# Patient Record
(demographics unavailable — no encounter records)

---

## 2024-10-25 NOTE — DISCUSSION/SUMMARY
[EKG obtained to assist in diagnosis and management of assessed problem(s)] : EKG obtained to assist in diagnosis and management of assessed problem(s) [FreeTextEntry1] : Patient is a 61 year-old gentleman with history and risk factors as above who presents today for cardiac evaluation prior to possible renal transplant. He has a history of bicuspid aortic valve, and on Echo in September 2024 at [facility], he was seen to have severe MR, possible flail anterior mitral leaflet. He remains well compensated at this time. Nuclear stress test to evaluate for ischemic heart disease done with Johann Enriquez MD  He knows that having his aortic valve replaced and mitral valve addressed will likely necessitate starting HD.

## 2024-10-25 NOTE — PHYSICAL EXAM
[General Appearance - Well Developed] : well developed [Normal Appearance] : normal appearance [Well Groomed] : well groomed [General Appearance - Well Nourished] : well nourished [No Deformities] : no deformities [General Appearance - In No Acute Distress] : no acute distress [Conjunctiva] : the conjunctiva were normal in both eyes [PERRL] : pupils were equal in size, round, and reactive to light [EOM Intact] : extraocular movements were intact [Normal Oral Mucosa] : normal oral mucosa [No Oral Pallor] : no oral pallor [No Oral Cyanosis] : no oral cyanosis [Normal Oropharynx] : normal oropharynx [Normal Jugular Venous A Waves Present] : normal jugular venous A waves present [Normal Jugular Venous V Waves Present] : normal jugular venous V waves present [No Jugular Venous Myles A Waves] : no jugular venous myles A waves [5th Left ICS - MCL] : palpated at the 5th LICS in the midclavicular line [Normal] : normal [No Precordial Heave] : no precordial heave was noted [Normal Rate] : normal [Rhythm Regular] : regular [Normal S1] : normal S1 [Normal S2] : normal S2 [No Gallop] : no gallop heard [III] : a grade 3 [II] : a grade 2 [No Pitting Edema] : no pitting edema present [] : no respiratory distress [Respiration, Rhythm And Depth] : normal respiratory rhythm and effort [Exaggerated Use Of Accessory Muscles For Inspiration] : no accessory muscle use [Auscultation Breath Sounds / Voice Sounds] : lungs were clear to auscultation bilaterally [Bowel Sounds] : normal bowel sounds [Abdomen Soft] : soft [Abdomen Tenderness] : non-tender [Abnormal Walk] : normal gait [Gait - Sufficient For Exercise Testing] : the gait was sufficient for exercise testing [Nail Clubbing] : no clubbing of the fingernails [Cyanosis, Localized] : no localized cyanosis [Skin Color & Pigmentation] : normal skin color and pigmentation [No Venous Stasis] : no venous stasis [No Xanthoma] : no  xanthoma was observed [Oriented To Time, Place, And Person] : oriented to person, place, and time [Impaired Insight] : insight and judgment were intact [Affect] : the affect was normal [Mood] : the mood was normal [No Anxiety] : not feeling anxious [Yellow Sclera (Icteric)] : no scleral icterus was seen [FreeTextEntry1] : multiple polychromatic tattoos, most recent was two headed eagle/dragon on anterior neck, his last name on posterior neck;

## 2024-10-25 NOTE — HISTORY OF PRESENT ILLNESS
[FreeTextEntry1] : Patient is a 60 year-old gentleman with known family history of coronary artery disease (father  at age 54), bicuspid aortic valve, paroxysmal SVT status post ablation (), with CKD secondary to polycystic kidney disease, who presents today as a preemptive candidate for renal transplant. Patient has no current cardiovascular complaints. Patient plays basketball occasionally and is without chest pains or shortness of breath. He used to play competitively, but these days, he just plays with his kids.  2022 - Patient returns today for follow-up in his usual state of health.  He remains a preemptive candidate for transplant (not yet on HD).  He had Covid-19 infection in 2021. It was a mild case. He had gout last week, and he continues to have mild left big toe pain after a short course of steroids.   2023 - Patient returns today for follow-up in his usual state of health. He remains a preemptive candidate for transplant.  He will have repeat echocardiogram and nuclear stress testing with Dr. Enriquez.   Cardiologist: Johann Enriquez MD [phone] Nephrologist: Burton Barrientos MD [phone]

## 2024-10-25 NOTE — CARDIOLOGY SUMMARY
[de-identified] : 12/4/2023 - atrial rhythm (likely sinus) at 65 bpm, low voltage in limb leads [de-identified] : 2/9/2020, PET stress test showing normal myocardial perfusion imaging, stress LVEF 55% (rest LVEF 46%) - no evidence of inducible ischemia 9/19/2022, 11 minutes of Willem protocol (12 METS), 90% MPHR, no evidence of infarction or ischemia, but LVEF 44% [de-identified] : 7/13/2020, possible bicuspid aortic valve, dilated LA, normal RV size and function (RVSP could not be estimated), normal LV systolic function, LVEF 65% 9/13/2022, bicuspid aortic valve with minimal stenosis, normal strain imaging, average GLS -16.2%, normal LV systolic function, LVEF 55% 9/3/2024 - cannot visualize aortic valve, severely dilated LA, severe mitral regurgitation, possible flail anterior mitral valve leaflet, normal LV systolic function, LVEF 61%

## 2024-10-25 NOTE — DISCUSSION/SUMMARY
[EKG obtained to assist in diagnosis and management of assessed problem(s)] : EKG obtained to assist in diagnosis and management of assessed problem(s) [FreeTextEntry1] : Patient is a 61 year-old gentleman with history and risk factors as above who presents today for cardiac evaluation prior to possible renal transplant. He has a history of bicuspid aortic valve, and on Echo in September 2024 at [facility], he was seen to have severe MR, possible flail anterior mitral leaflet. He remains well compensated at this time. Nuclear stress test to evaluate for ischemic heart disease done with Johann Enriquez MD  He knows that having his aortic valve replaced and mitral valve addressed will likely necessitate starting HD.

## 2024-10-25 NOTE — CARDIOLOGY SUMMARY
[de-identified] : 12/4/2023 - atrial rhythm (likely sinus) at 65 bpm, low voltage in limb leads [de-identified] : 2/9/2020, PET stress test showing normal myocardial perfusion imaging, stress LVEF 55% (rest LVEF 46%) - no evidence of inducible ischemia 9/19/2022, 11 minutes of Willem protocol (12 METS), 90% MPHR, no evidence of infarction or ischemia, but LVEF 44% [de-identified] : 7/13/2020, possible bicuspid aortic valve, dilated LA, normal RV size and function (RVSP could not be estimated), normal LV systolic function, LVEF 65% 9/13/2022, bicuspid aortic valve with minimal stenosis, normal strain imaging, average GLS -16.2%, normal LV systolic function, LVEF 55% 9/3/2024 - cannot visualize aortic valve, severely dilated LA, severe mitral regurgitation, possible flail anterior mitral valve leaflet, normal LV systolic function, LVEF 61%

## 2024-10-25 NOTE — HISTORY OF PRESENT ILLNESS
[FreeTextEntry1] : Patient is a 60 year-old gentleman with known family history of coronary artery disease (father  at age 54), bicuspid aortic valve, paroxysmal SVT status post ablation (), with CKD secondary to polycystic kidney disease, who presents today as a preemptive candidate for renal transplant. Patient has no current cardiovascular complaints. Patient plays basketball occasionally and is without chest pains or shortness of breath. He used to play competitively, but these days, he just plays with his kids.  2022 - Patient returns today for follow-up in his usual state of health.  He remains a preemptive candidate for transplant (not yet on HD).  He had Covid-19 infection in 2021. It was a mild case. He had gout last week, and he continues to have mild left big toe pain after a short course of steroids.   2023 - Patient returns today for follow-up in his usual state of health. He remains a preemptive candidate for transplant.  He will have repeat echocardiogram and nuclear stress testing with Dr. Enriquez.   Cardiologist: Johann Enriquez MD [phone] Nephrologist: Burton Barrientos MD [phone]

## 2024-10-25 NOTE — REASON FOR VISIT
[Other: ____] : [unfilled] [FreeTextEntry1] : October 2024 - Patient returns today for follow-up of his renal transplant list status. He remains a preemptive candidate for transplant. He had an echocardiogram showing normal LVEF at 61% but the aortic valve was not well visualized (history of bicuspid valve) and severe mitral regurgitation with possible anterior leaflet flail. He is very well compensated at this time.

## 2024-11-04 NOTE — HISTORY OF PRESENT ILLNESS
[de-identified] : 60 y/o M with HTN, HLD, bicuspid aortic valve/AR, severe MR, paroxysmal SVT status post ablation (2017), afib?, aortic aneurysm, ESRD due to PCKD, cerebral aneurysm, gout, presenting to establish care. Feeling well, no acute complaints.   Sx Hx: appendectomy, umbilical hernia repair, inguinal hernia repair, cardiac ablation, AV fistula creation   Family Hx: Dad- CAD/MI, PCKD. Brother- MR, AR/bicuspid aortic valve   Social Hx: never smoker, no alcohol, illicit drugs. Lives with fiancee, 2 children.

## 2024-11-04 NOTE — HEALTH RISK ASSESSMENT
[0] : 2) Feeling down, depressed, or hopeless: Not at all (0) [PHQ-2 Negative - No further assessment needed] : PHQ-2 Negative - No further assessment needed [Never] : Never [Good] : ~his/her~  mood as  good [No] : No [No falls in past year] : Patient reported no falls in the past year [[id number]] : 0 [With Significant Other] : lives with significant other [Significant Other] : lives with significant other [Fully functional (bathing, dressing, toileting, transferring, walking, feeding)] : Fully functional (bathing, dressing, toileting, transferring, walking, feeding) [Fully functional (using the telephone, shopping, preparing meals, housekeeping, doing laundry, using] : Fully functional and needs no help or supervision to perform IADLs (using the telephone, shopping, preparing meals, housekeeping, doing laundry, using transportation, managing medications and managing finances)

## 2024-11-04 NOTE — ASSESSMENT
[FreeTextEntry1] : .  60 y/o M with HTN, HLD, bicuspid aortic valve/AR, severe MR, paroxysmal SVT status post ablation (2017), afib?, aortic aneurysm, ESRD due to PCKD, cerebral aneurysm, gout, presenting to establish care. HPI as above.  # HTN; controlled - reviewed home BP, mostly 120s - c/w current regimen - Counselled on low salt diet, exercise as tolerated - cardio f/u  # HLD - Counselled on low salt diet, exercise as tolerated - 9/2024 LDL 91, c/w current statin  # bicuspid aortic valve/AR # severe MR # paroxysmal SVT status post ablation (2017) # afib? # aortic aneurysm - BP control and further med regimen as per cardio; has appt tomorrow and will get labs - c/w eliquis, tolerating well  # ESRD due to PCKD - would like new nephro referral - meds as per nephro - f/u T/S for [facility] transplant program  # cerebral aneurysm - f/u neurosx  # gout; stable - recent flare resolved  # HCM - recent labs reviewed - s/p flu vaccine - scope 11/2023 with 4cm lipoma and rec repeat 11/2025 - PSA - declines EKG today; has cardio f/u tomorrow  f/u 3 months

## 2024-11-19 NOTE — ASSESSMENT
[FreeTextEntry1] : .  62 y/o M with HTN, HLD, bicuspid aortic valve/AR, severe MR, paroxysmal SVT status post ablation (2017), afib?, aortic aneurysm, ESRD due to PCKD, cerebral aneurysm, recurrent gout, presenting for f/u.  # HTN; controlled - reviewed home BP, mostly 120s - c/w regimen - Counselled on low salt diet, exercise as tolerated - cardio f/u  # HLD - Counselled on low salt diet, exercise as tolerated - 9/2024 LDL 91, c/w pravastatin 20mg po daily  # bicuspid aortic valve/AR # severe MR # paroxysmal SVT status post ablation (2017) # afib? # aortic aneurysm - BP control and further med regimen as per cardio; has appt tomorrow and will get labs - c/w eliquis 5mg po BID, tolerating well  # ESRD due to PCKD - would like new nephro referral - meds as per nephro - f/u T/S for [facility] transplant program  # cerebral aneurysm - f/u neurosx  # recurrent gout? - f/u CBC, uric acid, ESR, CRP, xray - rheum eval  # HCM - recent labs reviewed - s/p flu vaccine - scope 11/2023 with 4cm lipoma and rec repeat 11/2025  f/u pending clinical course

## 2024-11-19 NOTE — HISTORY OF PRESENT ILLNESS
[de-identified] : 62 y/o M with HTN, HLD, bicuspid aortic valve/AR, severe MR, paroxysmal SVT status post ablation (2017), afib?, aortic aneurysm, ESRD due to PCKD, cerebral aneurysm, recurrent gout, presenting for f/u. + L foot hurting again. Pain at medial arch across top of foot. treated 3 weeks ago as gout flare and pain improved with steroids but swelling persists.

## 2024-11-19 NOTE — PHYSICAL EXAM
[No Acute Distress] : no acute distress [Well-Appearing] : well-appearing [No Respiratory Distress] : no respiratory distress  [Coordination Grossly Intact] : coordination grossly intact [Normal Affect] : the affect was normal [Normal Insight/Judgement] : insight and judgment were intact [de-identified] : L foot swelling, tender to palpation medial arch. No erythema.

## 2025-01-28 NOTE — ASSESSMENT
[FreeTextEntry1] : Very pleasant 61-year-old gentleman who presents for follow-up of polycystic kidney disease, gross hematuria -urinalysis most recently from January 2025 demonstrates 5 red blood cells per high-powered field -urine culture negative -urine cytology -GC/chlamydia -Ureaplasma/mycoplasma -PSA 2.94 -MRI images reviewed demonstrating multiple proteinaceous and hemorrhagic cysts but no concerning renal masses-cystoscopy -We discussed AUA guidelines regarding risk stratification for hematuria -Extensive discussion of the potential etiologies of hematuria, as well as the need to complete full work up for evaluation of cancer or other  sources of hematuria.  Patient understands and wishes to proceed.  Patient is being seen today for evaluation and management of a chronic and longitudinal ongoing condition and I am the primary treating physician

## 2025-01-28 NOTE — HISTORY OF PRESENT ILLNESS
[FreeTextEntry1] : Very pleasant 61-year-old gentleman who presents for follow-up of polycystic kidney disease, gross hematuria.  He reports that he was recently admitted to the hospital twice for a prolonged period of time due to excessive bleeding.  He reports anemia which started from a nosebleed.  He reports undergoing an endoscopy.  He recently underwent an MRI which demonstrated polycystic kidney disease.  He reports that he is now on dialysis.  He visibly saw blood in his urine.

## 2025-03-04 NOTE — ASSESSMENT
[FreeTextEntry1] : .  62 y/o M with HTN, HLD, bicuspid aortic valve/AR, severe MR, paroxysmal SVT status post ablation (2017), afib, PE/DVT, aortic aneurysm, PCKD now on HD, cerebral aneurysm, recurrent gout, anemia, GERD, presenting for f/u. Since last visit pt has been hospitalized multiple times since last visit.   # hematuria- resolved - following with urology with w/u planned.  # staph haemolyticus bacteremia - started vanc yesterday, c/w vanc as per nephro - will monitor night sweats as infection is treated further  # lower back pain/sciatica  - c/w tylenol, try to avoid PO NSAIDS - trial voltaren - PT  # HTN; too well controlled? - monitor for hypotension/lightheadedness - c/w clonidine 0.1mg po daily and diltiazem 120mg po daily - cardio/nephro f/u  # HLD - Counselled on low salt diet, exercise as tolerated - c/w pravastatin 20mg po daily  # bicuspid aortic valve/AR # severe MR # paroxysmal SVT status post ablation (2017) # afib # aortic aneurysm - BP control as above, f/u cardio/nephro - c/w warfarin as per cardio  # PE/DVT - c/w warfarin as per cardio   # PCKD now on HD - transplant on hold now given recent events  # cerebral aneurysm - f/u neurosx  # recurrent gout - c/w allopurinol 100mg po daily  # anemia - f/u hematology  # GERD - c/w protonix 40mg po daily  # HCM - s/p flu vaccine - scope 11/2023 with 4cm lipoma and rec repeat 11/2025

## 2025-03-04 NOTE — PHYSICAL EXAM
[No Acute Distress] : no acute distress [Well-Appearing] : well-appearing [No Lymphadenopathy] : no lymphadenopathy [No Respiratory Distress] : no respiratory distress  [No Accessory Muscle Use] : no accessory muscle use [Clear to Auscultation] : lungs were clear to auscultation bilaterally [Normal Rate] : normal rate  [Regular Rhythm] : with a regular rhythm [Soft] : abdomen soft [Non Tender] : non-tender [Non-distended] : non-distended [Normal Axillary Nodes] : no axillary lymphadenopathy [Coordination Grossly Intact] : coordination grossly intact [Normal Affect] : the affect was normal [Normal Insight/Judgement] : insight and judgment were intact

## 2025-03-04 NOTE — HISTORY OF PRESENT ILLNESS
[de-identified] : 60 y/o M with HTN, HLD, bicuspid aortic valve/AR, severe MR, paroxysmal SVT status post ablation (2017), afib, PE/DVT, aortic aneurysm, PCKD now on HD, cerebral aneurysm, recurrent gout, anemia, GERD, presenting for f/u. Since last visit pt has been hospitalized multiple times since last visit.  He developed PE/DVT despite eliquis, now on warfarin. Severe anemia due to nose bleeds, now s/p 7 units PRBC, IV iron, and epo. Hematuria, following with urology with w/u planned. Last week had fevers and night sweats, blood cx positive for staph haemolyticus and now on vanc at HD. Fevers resolved but night sweats persist. Having Lower back pain/sciatica symptoms. No weakness, incontinence, saddle anesthesia.

## 2025-03-11 NOTE — HISTORY OF PRESENT ILLNESS
[FreeTextEntry1] : Very pleasant 61-year-old gentleman who presents for follow-up of polycystic kidney disease, gross hematuria.  He reports problems with dialysis recently, however this is now improved.  He reports being treated for a staph infection recently from dialysis.  He underwent a cystoscopy today for gross hematuria.  This demonstrated no urothelial lesions in the bladder.  Urine cytology negative.  He reports no hematuria at this time, however recently reported another episode of gross hematuria.

## 2025-03-11 NOTE — ASSESSMENT
[FreeTextEntry1] : Very pleasant 61-year-old gentleman who presents for follow-up of complex renal cysts, gross hematuria -Cystoscopy today demonstrates no urothelial lesions in the bladder -Creatinine 10.38, on dialysis -Urine cytology negative -GC/chlamydia negative -Ureaplasma/mycoplasma negative -PSA 2.94 -MRI previously demonstrates polycystic kidney disease -We discussed potential etiologies of gross hematuria.  We discussed the likelihood of a bleeding renal cyst.  We discussed options for management moving forward if hematuria persists -Follow-up in 3 months or sooner if necessary

## 2025-05-09 NOTE — REASON FOR VISIT
[Home] : at home, [unfilled] , at the time of the visit. [Medical Office: ([facility])___] : at the medical office located in  [Telephone (audio)] : This telephonic visit was provided via audio only technology. [Patient preference] : patient preference. [Verbal consent obtained from patient] : the patient, [unfilled] [Follow-Up: _____] : a [unfilled] follow-up visit

## 2025-05-09 NOTE — HISTORY OF PRESENT ILLNESS
[FreeTextEntry1] :  Mr. EASTON is a 61 year old male with  past medical history of Hypertension, Polycystic kidney disease, Bicuspid aortic valve, MVP, known Mitral regurgitation for 40 yrs , SVT/AT (s/p ablation in 2017) and ckd -5  ( LT AVF since May 2023 but not on HD) . He was referred by Dr. Johann Enriquez. He is here for telephonic visit with recent imaging.   He reports that he is doing well. He stated that he just finished HD. He will be seeing Dr. Enriquez in 2 months and planning for surgery In July 2025 for his mitral valve. He is been seen by a pulmonologist for pulmonary nodules.   Patient is doing well and denies recent hospitalization, ER visits, or surgeries. He denies fever, chills, fatigue, headache, blurred vision, dizziness, syncope, chest pain, palpitations, shortness of breath, orthopnea, paroxysmal nocturnal dyspnea, nausea, vomiting, abdominal pain, back pain, headache, visual disturbances, CVA, PE, DVT, D/C, hematochezia, melena, dysuria, hematuria,  BRBPR or swelling to legs.   Family history : Father had heart attack at the age of 54  1/20/25 CTA Chest revealed Small segmental right upper pulmonary embolism. No CT evidence of right heart strain.    NYHA class II

## 2025-05-09 NOTE — DATA REVIEWED
[FreeTextEntry1] : 4/14/25 LISA revealed  1. Left ventricular cavity is normal in size. Left ventricular systolic function is normal with an ejection fraction visually estimated at 55 to 60 %.  2. Severe mitral regurgitation directed posteriorly due to flail of the entire anterior leaflet.  3. Bicuspid aortic valve with fusion of the right and left aortic cusps. Moderate aortic stenosis.  4. Mild pulmonic regurgitation.  5. Incidental finding of hepatic cysts (c/w known history of polycystic kidney disease).  1/20/25 CTA Chest revealed Small segmental right upper pulmonary embolism. No CT evidence of right heart strain.   11/28/23 CT ABD/Pelvis revealed mildly aneurysmal ascending thoracic aorta measuring 4.1 cm   10/17/23 TTE revealed LVEF 60 to 65%, Bicuspid aortic valve, mild to moderate eccentric aortic regurgitation. Mild AS, Moderate MVP Severe MR. Minimal TR.

## 2025-05-09 NOTE — ASSESSMENT
[FreeTextEntry1] : Mr. EASTON is a 61 year old male with  past medical history of Hypertension, Polycystic kidney disease, Bicuspid aortic valve, MVP, known Mitral regurgitation for 40 yrs , SVT/AT (s/p ablation in 2017) and ckd -5  ( LT AVF since May 2023 but not on HD) . He was referred by Dr. Johann Enriquez, for initial evaluation and management for Mitral Regurgitation.   He reports that he is doing well. He stated that he just finished HD. He will be seeing Dr. Enriquez in 2 months and planning for surgery In July 2025 for his mitral valve.    4/14/25 LISA revealed  1. Left ventricular cavity is normal in size. Left ventricular systolic function is normal with an ejection fraction visually estimated at 55 to 60 %.  2. Severe mitral regurgitation directed posteriorly due to flail of the entire anterior leaflet.  3. Bicuspid aortic valve with fusion of the right and left aortic cusps. Moderate aortic stenosis.  4. Mild pulmonic regurgitation.  5. Incidental finding of hepatic cysts (c/w known history of polycystic kidney disease).  1/20/25 CTA Chest revealed Small segmental right upper pulmonary embolism. No CT evidence of right heart strain.   The patient's medical records and diagnostic images were reviewed at the time of this office visit, and the following recommendation was made. Patient does not meet criteria for surgical intervention at the time and will be entered into the aortic registry surveillance program.      Plan   1. [facility] up [facility] 1 year with CTA Chest for aortic surveillance  2. Continue medication regimen. 3. Follow up with cardiologist and PCP. 4. BP control- I have recommended the patient to monitor his blood pressure closely. I have also advised the patient to take daily blood pressures at home and adhere to medication regimen. 5. Discussed signs and symptoms that warrant emergency medical attention 6. Follow up with Dr. Medrano to discuss valvular heart disease after discussing with Dr. Enriquez      Plan: 1) No indication for surgery for thoracic aortic aneurysm however need annual follow up with CTA chest for aortic surveillance  2) Will need follow up to assess Mitral and aortic regurgitation with TTE

## 2025-05-09 NOTE — CONSULT LETTER
[Dear  ___] : Dear  [unfilled], [Courtesy Letter:] : I had the pleasure of seeing your patient, [unfilled], in my office today. [Please see my note below.] : Please see my note below. [Consult Closing:] : Thank you very much for allowing me to participate in the care of this patient.  If you have any questions, please do not hesitate to contact me. [Sincerely,] : Sincerely, [FreeTextEntry2] : Dr. Johann Enriquez,  [FreeTextEntry1] :   Please find attached our consultation on your patient, Mr. EASTON We take a multidisciplinary team approach to patient care and consider you, the referring physician, an extension of our team. We will maintain an open line of communication with you throughout your patient's treatment course. It is our commitment to provide your patient with the highest quality of advanced therapeutic options. We thank you for allowing us to participate in the care of your patient. Please do not hesitate to contact our team with any questions or concerns at [phone].     Thank you for allowing us to participate in this patients care. [FreeTextEntry3] : Darwin Medrano MD  Department of cardiovascular and Thoracic surgery Professor Department of surgery [facility]

## 2025-05-29 NOTE — ASSESSMENT
[FreeTextEntry1] : .  62 y/o M with HTN, HLD, bicuspid aortic valve/AR, severe MR, paroxysmal SVT status post ablation (2017), afib, PE/DVT, aortic aneurysm, PCKD now on HD, cerebral aneurysm, recurrent gout, anemia, GERD, presenting for f/u. Hospitalized again since last visit with recurrent fever and bacteremia. LISA and nuclear scan neg for source. Now s/p course of vanc and pending repeat cultures.  + past 5 days with sore throat, sneezing, runny nose, mild cough. No fever.   # hematuria- resolved - following with urology with w/u planned.  # bacteremia - s/p vanc, pending repeat cx  # lower back pain/sciatica- better - c/w tylenol, try to avoid PO NSAIDS - trial voltaren - PT  # HTN - monitor for hypotension/lightheadedness - c/w clonidine 0.1mg po daily and diltiazem 120mg po daily - cardio/nephro f/u  # HLD - Counselled on low salt diet, exercise as tolerated - c/w pravastatin 20mg po daily  # bicuspid aortic valve/AR # severe MR # paroxysmal SVT status post ablation (2017) # afib # aortic aneurysm - BP control as above, f/u cardio/nephro - c/w warfarin as per cardio  # PE/DVT - c/w warfarin as per cardio  # PCKD now on HD - transplant on hold now given recent events  # cerebral aneurysm - f/u neurosx  # recurrent gout - c/w allopurinol 100mg po daily  # anemia - f/u hematology  # GERD - c/w protonix 40mg po daily  # URI symptoms- suspect allergies - f/u RVP - trial zyrtec (nasal spray if needed but with caution to avoid recurrent nosebleeds) - JUAN DIEGO prn for chest tightness   # HCM - s/p flu vaccine - scope 11/2023 with 4cm lipoma and rec repeat 11/2025.

## 2025-05-29 NOTE — HISTORY OF PRESENT ILLNESS
[de-identified] : 60 y/o M with HTN, HLD, bicuspid aortic valve/AR, severe MR, paroxysmal SVT status post ablation (2017), afib, PE/DVT, aortic aneurysm, PCKD now on HD, cerebral aneurysm, recurrent gout, anemia, GERD, presenting for f/u. Hospitalized again since last visit with recurrent fever and bacteremia. LISA and nuclear scan neg for source. Now s/p course of vanc and pending repeat cultures.  + past 5 days with sore throat, sneezing, runny nose, mild cough. No fever. Had some chest tightness earlier in week.

## 2025-05-29 NOTE — PHYSICAL EXAM
[No Acute Distress] : no acute distress [Well-Appearing] : well-appearing [No Lymphadenopathy] : no lymphadenopathy [No Respiratory Distress] : no respiratory distress  [No Accessory Muscle Use] : no accessory muscle use [Clear to Auscultation] : lungs were clear to auscultation bilaterally [Normal Rate] : normal rate  [Regular Rhythm] : with a regular rhythm [Coordination Grossly Intact] : coordination grossly intact [Normal Affect] : the affect was normal [Normal Insight/Judgement] : insight and judgment were intact [de-identified] : mild pharyngeal erythema

## 2025-07-22 NOTE — HISTORY OF PRESENT ILLNESS
[FreeTextEntry1] : Brief history : 62 yo with known Valvular heart disease, admission with  bacteremia in 2025 Kleb + staph hemolyticus     Fever Blood cultures + staph hemolyticus Vanc TERENCE 2 cleared   TTE  EF 56% Severe mitral regurgitation.   LISA CONCLUSIONS: Moderate thickenning of the anterior mitral valve leaflet  with flail A2-A3 and severe mitral regurgitation with multiple eccentric  posteriorly directed regurgitation jets. Cannot rule out leaflet perforation.  There is multiple small mobile echodensities with the largest attached to the  posterior mitral annulus with independent motion, which was not appreciated on  prior study and may suggest small vegetations. Bicuspid aortic valve with  fusion of left and right cusp+ mobile filamentous echodensity attached to the  noncoronary cusp with mild to moderate eccentric aortic regurgitation  suggestive of a vegetations.   [facility] normal coronaries    PMH : ESRD on HD via Left arm AVF, HTN, PE/DVT, valvular heart disease,  autosomal dominant polycystic kidney disease/Cystic disease of liver  2025 Kleb and staph hemolyticus bactermia     s/p mitral valve replacement with Mitris size 33 (bioprosthetic), aortic  valve replacement with Inspiris size 27 (bioprosthetic), Arias Maze IV, left  atrial appendage ligation with AtriClip size 50. The left atrial appendage was  also closed primarily from the left atrium using prolene.  Total CPB: 307Aortic XClamp:246  ECHO LV function preserved RV  : 2prbc, 20mcg DDAVP yesterday. Ambulated.  lactate clearing. CRRT for  clearance for hyperkalemia.  7/10: lactate cleared.  5, plan to wean  today. iHD today. keep chest  tubes today. send Pf4 for thrombocytopenia.  : wean inotrops as tolerated. iHD today. junctional w/ PVC's. monitor  ABG/VBG. ASA, SQH for DVT ppx yesterday/ No signs of active bleeding. d/w CTS  team reg timing for AC (takes coumadin at home for DVTs/PE)  LEFT FEM A-LINE D/C'D IN CTU. TX TO SDU, +RIJ, +PW 70/10    decreased to 30, maintaining afib 70's  HIT sent , monitor  thrombocytopenia, hep sq discontinued   maintaining low voltage afib, 70s. HD yesterday 1.2L Next HD tomorrow.  Continue with Vanco 500 mg post HD for staph haemolyticus bacteremia, Isolate  PW, Dc RIJ. ASA and HSQ    HD today, increaseing fluid removal to 1.7L for edema.  Eliquis as per  Dr Medrano.  EPS recommends  no  meds for rate control and when leaves the  hospital MCOT monitoring.  7/15  UF today, repeat vanco level pending    Ooze noted  from ms ct sites dark blood.  He received 2 doses of eliquis  yesterday , on hold for PW removal , held, connt holding eliquis  tte:< from: TTE W or WO Ultrasound Enhancing Agent (07.15.25 @ 07:43) >  CONCLUSIONS:     1. Left ventricular cavity is normal in size. Left ventricular systolic  function is low normal with an ejection fraction visually estimated at 50 to 55  %.   2. A small-moderate circumferential pericardial effusion is present, without  diastolic inversion of the right ventricle. The effusion is most prominent  adjacent to the right ventricle, the apex, and the anterolateral wall. Septal  motion is most consistent with constrictive physiology.   3. A bioprosthetic mitral valve is present.   4. Bilateral pleural effusion noted.    < end of copied text >    TTE d/w Dr Medrano.  PW D/C THIS AM, PLAN TO START ELIQUIS  TOMORROW am.  nO FURTHER BLEEDING FROM CT SITES     D/w ID repeat vanco level today, dosing long term pending.  He will need  iv abx for 6 weeks from the day of OR   VSS, less LE edema no HD today, Ct sites w/o bleeding, Tx to floor, ? D/c  tomorrow after HD or Monday after HD, antibiotics post HD x 6 weeks total, K+  3.6 replaced with 40meq po    d/c planning for today after HD, awaitng [facility] for outpatient abx  Initial visit completed at home  CC " I am doing ok"

## 2025-07-22 NOTE — ASSESSMENT
[FreeTextEntry1] : Pt recovering well at home s/p OHS. Reviewed all medications and dosages with pt understanding. Pt has all medications in home and is taking as prescribed. Pain controlled with current medication regimen. No new symptoms, issues or concerns to report at this time.

## 2025-07-22 NOTE — PLAN
[TextEntry] : Post Operative Care:  Pt advised of importance of daily weights. Pt instructed on how to use incentive spirometer every hour, demonstrated proper use. Pt encouraged to ambulate as much as tolerated, avoiding extreme temperatures outdoors. Also advised to cleanse incisions daily with mild soap and water and to avoid lotions, powders, ointments or creams near or on the incision. Low salt, low fat diet encouraged and discussed. Pt advised to avoid heavy lifting or straining.     [facility] team will continue to follow up with pt status.  NP/CCC roles explained with pt understanding, contact information provided. Pt agrees to call with any questions, issues or concerns.  Worsening symptoms reviewed with patient understanding.      FOLLOW UP APPOINTMENTS:  CTS:  7/29  CARDIOLOGIST:  7/29  EP:  pt. will call to schedule appointment to be seen within 1 week  PCP: Pt encouraged to follow up within one month of discharge

## 2025-07-22 NOTE — PHYSICAL EXAM
[] : no respiratory distress [Exaggerated Use Of Accessory Muscles For Inspiration] : no accessory muscle use [Auscultation Breath Sounds / Voice Sounds] : lungs were clear to auscultation bilaterally [Heart Sounds] : normal S1 and S2 [Chest Visual Inspection Thoracic Asymmetry] : no chest asymmetry [Bowel Sounds] : normal bowel sounds [Oriented To Time, Place, And Person] : oriented to person, place, and time [FreeTextEntry2] : no LE edema noted [FreeTextEntry1] : LB 7 7/22

## 2025-07-29 NOTE — ASSESSMENT
[FreeTextEntry1] : Mr. INDRA EASTON, 61 year old male, w/ hx of known Valvular heart disease, admission with bacteremia in 2025 Kleb + staph hemolyticus.   Fever Blood cultures + staph hemolyticus Vanc TERENCE 2 cleared  TTE EF 56% Severe mitral regurgitation.  LISA CONCLUSIONS: Moderate thickenning of the anterior mitral valve leaflet with flail A2-A3 and severe mitral regurgitation with multiple eccentric posteriorly directed regurgitation jets. Cannot rule out leaflet perforation. There is multiple small mobile echo densities with the largest attached to the posterior mitral annulus with independent motion, which was not appreciated on prior study and may suggest small vegetations. Bicuspid aortic valve with fusion of left and right cusp+ mobile filamentous echodensity attached to the noncoronary cusp with mild to moderate eccentric aortic regurgitation suggestive of a vegetations.   [facility] normal coronaries  PMH : ESRD on HD via Left arm AVF, HTN, PE/DVT, valvular heart disease, autosomal dominant polycystic kidney disease/Cystic disease of liver 2025 Kleb and staph hemolyticus bactermia   s/p mitral valve replacement with Mitris size 33 (bioprosthetic), aortic valve replacement with Inspiris size 27 (bioprosthetic), Arias Maze IV, left atrial appendage ligation with AtriClip size 50. The left atrial appendage was also closed primarily from the left atrium using prolene. 7/10: lactate cleared.  5, plan to wean  today. iHD  : wean inotrops as tolerated. iHD today. junctional w/ PVC's. monitor ABG/VBG. ASA, SQH for DVT ppx yesterday/ No signs of active bleeding. d/w CTS team reg timing for AC (takes coumadin at home for DVTs/PE) LEFT FEM A-LINE D/C'D IN CTU. TX TO SDU, +RIJ, +PW 70/10   decreased to 30, maintaining afib 70's  HIT sent , monitor thrombocytopenia, hep sq discontinued  maintaining low voltage afib, 70s. HD yesterday 1.2L Next HD tomorrow. Continue with Vanco 500 mg post HD for staph hemolytic bacteremia, Isolate    increasing fluid removal to 1.7L for edema.  Eliquis as per Dr Medrano.  EPS recommends  no  meds for rate control and when leaves the hospital MCOT monitoring. 7/15  UF today, repeat vanco level pending. Ooze noted  from ms ct sites dark blood.  He received 2 doses of eliquis , on hold for PW removal , held, connt holding eliquis   TTE d/w Dr Medrano.  PW D/C THIS AM, PLAN TO START ELIQUIS  TOMORROW am. nO FURTHER BLEEDING FROM CT SITES D/w ID repeat vanco level today, dosing long term pending.  He will need iv abx for 6 weeks from the day of OR  VSS, less LE edema no HD today, Ct sites w/o bleeding, Tx to floor, ? D/c tomorrow after HD or Monday after HD, antibiotics post HD x 6 weeks total, K+ 3.6 replaced with 40meq po   d/c planning for today after HD, awaiting auth for outpatient abx  Today he presents and reports that he is doing well. He will finish his antibiotics on 25.    Gradually progressing since DC and walking more every day. Eating and drinking with +BM. Denies chest pain, SOB, swelling, weight gain, palpitations, cough, fever or chills.     Today on exam bilateral lung fields are clear, no wheezing or rales, no use of accessory muscles noted or respiratory distress, normal sinus rhythm, sternum stable, incision clean, dry and intact. No peripheral edema noted.    Instructed patient on importance of optimal glycemic control, daily showering, daily weights, any signs of fever (temperature greater than 101F, chills,  incentive spirometer use, and increase ambulation as tolerated. Instructed to call office with any signs or symptoms of infection or weight gain of 2 or more pounds in 1 day or 3 or more pounds in 1 week.   Discussed intake of plant based foods, including vegetables, fruits, and whole grain foods: legumes, nuts and seeds, fish or seafood, lean meats, and non-fat or low-fat diary foods. Plant based oils (non-tropical) in place of solid fats. Instructed patient to limit intake of high fat meats and processed meats, high-fat diary foods, dietary cholesterol and sodium, foods and beverages with added sugars.   Plan: 1) Continue current medication regimen 2) Follow up with cardiologist ( Dr. Enriquez) and PCP 3) Follow up with ID on 25  4) Follow up  in 3 months with TTE  5) SBE antibiotic prophylaxis discussed at length 6) Continue to increase activity and walk daily as tolerated. Continue to use incentive spirometer. 7) Keep legs elevated above heart when resting/sitting/sleeping. 8) Call MD if you experience fever, fatigue, dizziness, confusion, syncope, shortness of breath, chest pain not relieved with analgesics, increased redness/drainage from the surgical  incision site 9) Virtual Cardiac Rehab referral 10) Follow up with EP Dr. SEVILLA on 9/3/25

## 2025-07-29 NOTE — REASON FOR VISIT
[de-identified] : mitral valve replacement with Mitris size 33 (bioprosthetic), aortic valve replacement with Inspiris size 27 (bioprosthetic), Arias Maze IV, left atrial appendage ligation with AtriClip size 50 [de-identified] : 7/8/25

## 2025-07-29 NOTE — ASSESSMENT
[FreeTextEntry1] : Mr. INDRA EASTON, 61 year old male, w/ hx of known Valvular heart disease, admission with bacteremia in 2025 Kleb + staph hemolyticus.   Fever Blood cultures + staph hemolyticus Vanc TERENCE 2 cleared  TTE EF 56% Severe mitral regurgitation.  LISA CONCLUSIONS: Moderate thickenning of the anterior mitral valve leaflet with flail A2-A3 and severe mitral regurgitation with multiple eccentric posteriorly directed regurgitation jets. Cannot rule out leaflet perforation. There is multiple small mobile echo densities with the largest attached to the posterior mitral annulus with independent motion, which was not appreciated on prior study and may suggest small vegetations. Bicuspid aortic valve with fusion of left and right cusp+ mobile filamentous echodensity attached to the noncoronary cusp with mild to moderate eccentric aortic regurgitation suggestive of a vegetations.   [facility] normal coronaries  PMH : ESRD on HD via Left arm AVF, HTN, PE/DVT, valvular heart disease, autosomal dominant polycystic kidney disease/Cystic disease of liver 2025 Kleb and staph hemolyticus bactermia   s/p mitral valve replacement with Mitris size 33 (bioprosthetic), aortic valve replacement with Inspiris size 27 (bioprosthetic), Arias Maze IV, left atrial appendage ligation with AtriClip size 50. The left atrial appendage was also closed primarily from the left atrium using prolene. 7/10: lactate cleared.  5, plan to wean  today. iHD  : wean inotrops as tolerated. iHD today. junctional w/ PVC's. monitor ABG/VBG. ASA, SQH for DVT ppx yesterday/ No signs of active bleeding. d/w CTS team reg timing for AC (takes coumadin at home for DVTs/PE) LEFT FEM A-LINE D/C'D IN CTU. TX TO SDU, +RIJ, +PW 70/10   decreased to 30, maintaining afib 70's  HIT sent , monitor thrombocytopenia, hep sq discontinued  maintaining low voltage afib, 70s. HD yesterday 1.2L Next HD tomorrow. Continue with Vanco 500 mg post HD for staph hemolytic bacteremia, Isolate    increasing fluid removal to 1.7L for edema.  Eliquis as per Dr Medrano.  EPS recommends  no  meds for rate control and when leaves the hospital MCOT monitoring. 7/15  UF today, repeat vanco level pending. Ooze noted  from ms ct sites dark blood.  He received 2 doses of eliquis , on hold for PW removal , held, connt holding eliquis   TTE d/w Dr Medrano.  PW D/C THIS AM, PLAN TO START ELIQUIS  TOMORROW am. nO FURTHER BLEEDING FROM CT SITES D/w ID repeat vanco level today, dosing long term pending.  He will need iv abx for 6 weeks from the day of OR  VSS, less LE edema no HD today, Ct sites w/o bleeding, Tx to floor, ? D/c tomorrow after HD or Monday after HD, antibiotics post HD x 6 weeks total, K+ 3.6 replaced with 40meq po   d/c planning for today after HD, awaiting auth for outpatient abx  Today he presents and reports that he is doing well. He will finish his antibiotics on 25.    Gradually progressing since DC and walking more every day. Eating and drinking with +BM. Denies chest pain, SOB, swelling, weight gain, palpitations, cough, fever or chills.     Today on exam bilateral lung fields are clear, no wheezing or rales, no use of accessory muscles noted or respiratory distress, normal sinus rhythm, sternum stable, incision clean, dry and intact. No peripheral edema noted.    Instructed patient on importance of optimal glycemic control, daily showering, daily weights, any signs of fever (temperature greater than 101F, chills,  incentive spirometer use, and increase ambulation as tolerated. Instructed to call office with any signs or symptoms of infection or weight gain of 2 or more pounds in 1 day or 3 or more pounds in 1 week.   Discussed intake of plant based foods, including vegetables, fruits, and whole grain foods: legumes, nuts and seeds, fish or seafood, lean meats, and non-fat or low-fat diary foods. Plant based oils (non-tropical) in place of solid fats. Instructed patient to limit intake of high fat meats and processed meats, high-fat diary foods, dietary cholesterol and sodium, foods and beverages with added sugars.   Plan: 1) Continue current medication regimen 2) Follow up with cardiologist ( Dr. Enriquez) and PCP 3) Follow up with ID on 25  4) Follow up  in 3 months with TTE  5) SBE antibiotic prophylaxis discussed at length 6) Continue to increase activity and walk daily as tolerated. Continue to use incentive spirometer. 7) Keep legs elevated above heart when resting/sitting/sleeping. 8) Call MD if you experience fever, fatigue, dizziness, confusion, syncope, shortness of breath, chest pain not relieved with analgesics, increased redness/drainage from the surgical  incision site 9) Virtual Cardiac Rehab referral 10) Follow up with EP Dr. SEVILLA on 9/3/25

## 2025-07-29 NOTE — REASON FOR VISIT
[de-identified] : mitral valve replacement with Mitris size 33 (bioprosthetic), aortic valve replacement with Inspiris size 27 (bioprosthetic), Arias Maze IV, left atrial appendage ligation with AtriClip size 50 [de-identified] : 7/8/25